# Patient Record
Sex: FEMALE | Race: WHITE | Employment: OTHER | URBAN - METROPOLITAN AREA
[De-identification: names, ages, dates, MRNs, and addresses within clinical notes are randomized per-mention and may not be internally consistent; named-entity substitution may affect disease eponyms.]

---

## 2020-10-12 ENCOUNTER — THERAPY VISIT (OUTPATIENT)
Dept: PHYSICAL THERAPY | Facility: CLINIC | Age: 35
End: 2020-10-12
Payer: COMMERCIAL

## 2020-10-12 DIAGNOSIS — M54.9 BACK PAIN DURING PREGNANCY: ICD-10-CM

## 2020-10-12 DIAGNOSIS — O99.891 BACK PAIN DURING PREGNANCY: ICD-10-CM

## 2020-10-12 PROCEDURE — 97161 PT EVAL LOW COMPLEX 20 MIN: CPT | Mod: GP | Performed by: PHYSICAL THERAPIST

## 2020-10-12 PROCEDURE — 97530 THERAPEUTIC ACTIVITIES: CPT | Mod: GP | Performed by: PHYSICAL THERAPIST

## 2020-10-12 PROCEDURE — 97110 THERAPEUTIC EXERCISES: CPT | Mod: GP | Performed by: PHYSICAL THERAPIST

## 2020-10-12 NOTE — LETTER
[facility]THLETIC [facility] PHYSICAL THERAPY  [address] [place] [address]  [phone]    2020    Re: Natasha Rojo   :   1985  MRN:  [id number]   REFERRING PHYSICIAN:   Esthela Reza MD    [facility]THLETIC [facility] PHYSICAL [facility]    Date of Initial Evaluation:  10/12/20  Visits:  Rxs Used: 1  Reason for Referral:  Back pain during pregnancy        [facility] Initial [facility]    Subjective:  The history is provided by the patient. No  was used.     Therapist Generated HPI Evaluation  Problem details: The pt presents today 28 weeks pregnant, and noticed pain that began 4-6 weeks ago. Her pain began in her pubic bones when walking, rolling in bed, and shifting her weight side to side in standing. Then 4 weeks ago she started having numbness in her left glute and saw a chiropractor, which reduced her sciatic pain significantly. Now she has sciatic pain every 4-5 days.     Patient denies any urinary incontinence, pain with intercourse, low back pain, or pain with bowel movements. She does report constipation throughout this pregnancy.    Activity: walks 2-3x a week, for 10 minutes, gentle glute stretches daily    Goals: walk 20-30 minutes a day, get dressed without pain.         Affected Side: pubic bones.    This is a new condition.  Condition occurred with:  Insidious onset.    Site of Pain: pubic rami L>R.    Pain radiates to:  Gluteals left.     Associated symptoms:  Numbness and pregnancy. Symptoms are exacerbated by walking (rolling in bed, stairs)  and relieved by activity/movement, rest and heat.          Re: Natasha Rojo   :   1985      Patient Health History  Natasha Rojo being seen for Pt for maternal pelvic pain.     Date of Onset: 20.   Problem occurred: pregnancy   Pain is reported as 4/10 on pain scale.  General health as reported by  patient is good.  Health conditions: scoiosis, currently pregnant, numbness/tingling.   Red flags:  None as reported by patient.     Surgeries: D&E 10/2018.     Other medications details: allergy.    Current occupation is professional speaker.   Primary job tasks include:  Computer work and prolonged sitting.                Previous and current functional limitations:  (See Goal Flow Sheet for this information)    Short term and Long term goals: (See Goal Flow Sheet for this information)     Communication ability:  Patient appears to be able to clearly communicate and understand verbal and written communication and follow directions correctly.  Treatment Explanation - The following has been discussed with the patient:   RX ordered/plan of care  Anticipated outcomes  Possible risks and side effects  This patient would benefit from PT intervention to resume normal activities.   Rehab potential is good.    Frequency:  1 X week, once daily  Duration:  for 8 weeks  Discharge Plan:  Achieve all LTG.  Independent in home treatment program.  Reach maximal therapeutic benefit.    Please refer to the daily flowsheet for treatment today, total treatment time and time spent performing 1:1 timed codes.                     Objective:  Standing Alignment:    Cervical/Thoracic:  Convex thoracic scoliosis R    Pelvic:  ASIS high R    Lumbar/SI Evaluation    Lumbar DTR's:  Lumbar dtr's: patient able to heel toe walk without any difficulty.    Lumbar Dermtomes:  Lumbar dermatomes: decreased left posterior thigh and glute light touch sensation.    Re: Natasha Rojo   :   1985        Neural Tension/Mobility:    Left side:SLR; SLR w/DF or Slump  negative.   Right side:   SLR w/DF or SLR  negative.     Lumbar Palpation:    Tenderness present at Left:    Piriformis; PSIS; ASIS; Gluteus Medius and Hip Flexors  Tenderness present at Right: PSIS; ASIS and Hip Flexors    Pelvic Dysfunction Evaluation:      Flexibility:     Tightness present at:Adductors; Piriformis and Gluteals    SI Provocation:    Positive for: SI Compression and ASLR  Negative for:  Fabres    Additional History:  Number of Pregnancies: 2        Hip Evaluation    Hip Strength:    Flexion:   Left: 5-/5   +  Pain:  Right: 5-/5   +  Pain:                   Abduction:  Left: 4-/5    ++   Pain:Right: 4+/5    Pain:  Adduction:  Left: 4+/5    Pain:Right: 4+/5   Pain:  Internal Rotation:  Left: 5/5    Pain:Right: 5/5   Pain:  External Rotation:  Left: 5-/5   Pain:  Right: 5-/5   Pain:  Knee Flexion:  Left: 5/5   Pain:Right: 5/5   Pain:  Knee Extension:  Left: 5/5   Pain:Right: 5/5    Pain:    Assessment/Plan:    Patient is a 35 year old female with pelvic complaints.    Patient has the following significant findings with corresponding treatment plan.                Diagnosis 1:  Pelvic girdle pain during pregnancy  Pain -  hot/cold therapy, US, electric stimulation, mechanical traction, manual therapy, STS, splint/taping/bracing/orthotics, self management, education, directional preference exercise and home program  Decreased ROM/flexibility - manual therapy, therapeutic exercise, therapeutic activity and home program  Decreased strength - therapeutic exercise, therapeutic activities and home program  Impaired muscle performance - biofeedback, electric stimulation, neuro re-education and home program            Re: Natasha Rojo   :   1985          Therapy Evaluation Codes:   Cumulative Therapy Evaluation is: Low complexity.    Previous and current functional limitations:  (See Goal Flow Sheet for this information)    Short term and Long term goals: (See Goal Flow Sheet for this information)     Communication ability:  Patient appears to be able to clearly communicate and understand verbal and written communication and follow directions correctly.  Treatment Explanation - The following has been discussed with the patient:   RX ordered/plan of  care  Anticipated outcomes  Possible risks and side effects  This patient would benefit from PT intervention to resume normal activities.   Rehab potential is good.    Frequency:  1 X week, once daily  Duration:  for 8 weeks  Discharge Plan:  Achieve all LTG.  Independent in home treatment program.  Reach maximal therapeutic benefit.    Please refer to the daily flowsheet for treatment today, total treatment time and time spent performing 1:1 timed codes.         Thank you for your referral.    INQUIRIES  Therapist: Ashley Ruff DPT  [facility] PH[facility]ICAL THERAP[facility]  [address] [place] [address]  Phone: [phone]  Fax: [phone]

## 2020-10-12 NOTE — PROGRESS NOTES
[facility] Initial Evaluation  Subjective:  The history is provided by the patient. No  was used.   Therapist Generated HPI Evaluation  Problem details: The pt presents today 28 weeks pregnant, and noticed pain that began 4-6 weeks ago. Her pain began in her pubic bones when walking, rolling in bed, and shifting her weight side to side in standing. Then 4 weeks ago she started having numbness in her left glute and saw a chiropractor, which reduced her sciatic pain significantly. Now she has sciatic pain every 4-5 days.     Patient denies any urinary incontinence, pain with intercourse, low back pain, or pain with bowel movements. She does report constipation throughout this pregnancy.    Activity: walks 2-3x a week, for 10 minutes, gentle glute stretches daily    Goals: walk 20-30 minutes a day, get dressed without pain.         Affected Side: pubic bones.    This is a new condition.  Condition occurred with:  Insidious onset.    Site of Pain: pubic rami L>R.    Pain radiates to:  Gluteals left.     Associated symptoms:  Numbness and pregnancy. Symptoms are exacerbated by walking (rolling in bed, stairs)  and relieved by activity/movement, rest and heat.          Patient Health History  Natasha Rojo being seen for Pt for maternal pelvic pain.     Date of Onset: 8/21/20.   Problem occurred: pregnancy   Pain is reported as 4/10 on pain scale.  General health as reported by patient is good.  Health conditions: scoiosis, currently pregnant, numbness/tingling.   Red flags:  None as reported by patient.     Surgeries: D&E 10/2018.     Other medications details: allergy.    Current occupation is professional speaker.   Primary job tasks include:  Computer work and prolonged sitting.                    Previous and current functional limitations:  (See Goal Flow Sheet for this information)    Short term and Long term goals: (See Goal Flow Sheet for this information)      Communication ability:  Patient appears to be able to clearly communicate and understand verbal and written communication and follow directions correctly.  Treatment Explanation - The following has been discussed with the patient:   RX ordered/plan of care  Anticipated outcomes  Possible risks and side effects  This patient would benefit from PT intervention to resume normal activities.   Rehab potential is good.    Frequency:  1 X week, once daily  Duration:  for 8 weeks  Discharge Plan:  Achieve all LTG.  Independent in home treatment program.  Reach maximal therapeutic benefit.    Please refer to the daily flowsheet for treatment today, total treatment time and time spent performing 1:1 timed codes.                     Objective:  Standing Alignment:    Cervical/Thoracic:  Convex thoracic scoliosis R      Pelvic:  ASIS high R                         Lumbar/SI Evaluation      Lumbar DTR's:  Lumbar dtr's: patient able to heel toe walk without any difficulty.        Lumbar Dermtomes:  Lumbar dermatomes: decreased left posterior thigh and glute light touch sensation.                Neural Tension/Mobility:      Left side:SLR; SLR w/DF or Slump  negative.     Right side:   SLR w/DF or SLR  negative.   Lumbar Palpation:    Tenderness present at Left:    Piriformis; PSIS; ASIS; Gluteus Medius and Hip Flexors  Tenderness present at Right: PSIS; ASIS and Hip Flexors                                Pelvic Dysfunction Evaluation:        Flexibility:    Tightness present at:Adductors; Piriformis and Gluteals        SI Provocation:    Positive for: SI Compression and ASLR  Negative for:  Fabres            Additional History:    Number of Pregnancies: 2              Hip Evaluation    Hip Strength:    Flexion:   Left: 5-/5   +  Pain:  Right: 5-/5   +  Pain:                      Abduction:  Left: 4-/5    ++   Pain:Right: 4+/5    Pain:  Adduction:  Left: 4+/5    Pain:Right: 4+/5   Pain:  Internal Rotation:  Left: 5/5     Pain:Right: 5/5   Pain:  External Rotation:  Left: 5-/5   Pain:  Right: 5-/5   Pain:  Knee Flexion:  Left: 5/5   Pain:Right: 5/5   Pain:  Knee Extension:  Left: 5/5   Pain:Right: 5/5    Pain:                       General     ROS    Assessment/Plan:    Patient is a 35 year old female with pelvic complaints.    Patient has the following significant findings with corresponding treatment plan.                Diagnosis 1:  Pelvic girdle pain during pregnancy  Pain -  hot/cold therapy, US, electric stimulation, mechanical traction, manual therapy, STS, splint/taping/bracing/orthotics, self management, education, directional preference exercise and home program  Decreased ROM/flexibility - manual therapy, therapeutic exercise, therapeutic activity and home program  Decreased strength - therapeutic exercise, therapeutic activities and home program  Impaired muscle performance - biofeedback, electric stimulation, neuro re-education and home program    Therapy Evaluation Codes:   Cumulative Therapy Evaluation is: Low complexity.    Previous and current functional limitations:  (See Goal Flow Sheet for this information)    Short term and Long term goals: (See Goal Flow Sheet for this information)     Communication ability:  Patient appears to be able to clearly communicate and understand verbal and written communication and follow directions correctly.  Treatment Explanation - The following has been discussed with the patient:   RX ordered/plan of care  Anticipated outcomes  Possible risks and side effects  This patient would benefit from PT intervention to resume normal activities.   Rehab potential is good.    Frequency:  1 X week, once daily  Duration:  for 8 weeks  Discharge Plan:  Achieve all LTG.  Independent in home treatment program.  Reach maximal therapeutic benefit.    Please refer to the daily flowsheet for treatment today, total treatment time and time spent performing 1:1 timed codes.

## 2020-10-23 ENCOUNTER — THERAPY VISIT (OUTPATIENT)
Dept: PHYSICAL THERAPY | Facility: CLINIC | Age: 35
End: 2020-10-23
Payer: COMMERCIAL

## 2020-10-23 DIAGNOSIS — M54.9 BACK PAIN DURING PREGNANCY: ICD-10-CM

## 2020-10-23 DIAGNOSIS — O99.891 BACK PAIN DURING PREGNANCY: ICD-10-CM

## 2020-10-23 PROCEDURE — 97530 THERAPEUTIC ACTIVITIES: CPT | Mod: GP | Performed by: PHYSICAL THERAPIST

## 2020-10-23 PROCEDURE — 97110 THERAPEUTIC EXERCISES: CPT | Mod: GP | Performed by: PHYSICAL THERAPIST

## 2020-11-02 ENCOUNTER — THERAPY VISIT (OUTPATIENT)
Dept: PHYSICAL THERAPY | Facility: CLINIC | Age: 35
End: 2020-11-02
Payer: COMMERCIAL

## 2020-11-02 DIAGNOSIS — O99.891 BACK PAIN DURING PREGNANCY: ICD-10-CM

## 2020-11-02 DIAGNOSIS — M54.9 BACK PAIN DURING PREGNANCY: ICD-10-CM

## 2020-11-02 PROCEDURE — 97110 THERAPEUTIC EXERCISES: CPT | Mod: GP | Performed by: PHYSICAL THERAPIST

## 2020-11-02 PROCEDURE — 97530 THERAPEUTIC ACTIVITIES: CPT | Mod: GP | Performed by: PHYSICAL THERAPIST

## 2020-11-02 PROCEDURE — 97112 NEUROMUSCULAR REEDUCATION: CPT | Mod: GP | Performed by: PHYSICAL THERAPIST

## 2020-11-17 ENCOUNTER — THERAPY VISIT (OUTPATIENT)
Dept: PHYSICAL THERAPY | Facility: CLINIC | Age: 35
End: 2020-11-17
Payer: COMMERCIAL

## 2020-11-17 DIAGNOSIS — M54.9 BACK PAIN DURING PREGNANCY: ICD-10-CM

## 2020-11-17 DIAGNOSIS — O99.891 BACK PAIN DURING PREGNANCY: ICD-10-CM

## 2020-11-17 PROCEDURE — 97535 SELF CARE MNGMENT TRAINING: CPT | Mod: GP | Performed by: PHYSICAL THERAPIST

## 2020-11-17 PROCEDURE — 97140 MANUAL THERAPY 1/> REGIONS: CPT | Mod: GP | Performed by: PHYSICAL THERAPIST

## 2021-01-25 NOTE — PROGRESS NOTES
Discharge Note    Progress reporting period is from last progress note on   to Nov 17, 2020.    Natasha failed to follow up and current status is unknown.  Please see information below for last relevant information on current status.  Patient seen for 4 visits.    SUBJECTIVE  Subjective changes noted by patient:  The pt reports her pain in her pelvis is not as frequent. The pt is 33 weeks today and that the baby is in the 99th percentile growth. She reports her pain does not seem to correlate with any specific movements. The pt reports the most pubic symphysis pain.  .  Current pain level is  .     Previous pain level was  4/10.   Changes in function:  Yes (See Goal flowsheet attached for changes in current functional level)  Adverse reaction to treatment or activity: None    OBJECTIVE  Changes noted in objective findings: Palpation: ttp pubic symphysis, pubic rami, and adductors bilaterally     ASSESSMENT/PLAN  Diagnosis: pelvic girdle pain during pregnancy   Updated problem list and treatment plan:   Pain - HEP  Decreased ROM/flexibility - HEP  Decreased strength - HEP  Impaired muscle performance - HEP  STG/LTGs have been met or progress has been made towards goals:  Yes, please see goal flowsheet for most current information  Assessment of Progress: current status is unknown.    Last current status: Pt is progressing as expected   Self Management Plans:  HEP  I have re-evaluated this patient and find that the nature, scope, duration and intensity of the therapy is appropriate for the medical condition of the patient.  Natasha continues to require the following intervention to meet STG and LTG's:  HEP.    Recommendations:  Discharge with current home program.  Patient to follow up with MD as needed.    Please refer to the daily flowsheet for treatment today, total treatment time and time spent performing 1:1 timed codes.